# Patient Record
(demographics unavailable — no encounter records)

---

## 2025-02-03 NOTE — ADDENDUM
[FreeTextEntry1] : Documented by Sheri Patterson acting as a scribe for Dr. Bowers and Barotlome Alarcon PA-C on 02/03/2025. All medical record entries made by the Scribe were at my, Dr. Bowers, and Bartolome Alarcon's, direction and personally dictated by me on 02/03/2025. I have reviewed the chart and agree that the record accurately reflects my personal performance of the history, physical exam, procedure and imaging.

## 2025-02-03 NOTE — PHYSICAL EXAM
[de-identified] : General: Well appearing, no acute distress  Neurologic: A&Ox3, No focal deficits  Head: NCAT without abrasions, lacerations, or ecchymosis to head, face, or scalp  Eyes: No scleral icterus, no gross abnormalities  Respiratory: Equal chest wall expansion bilaterally, no accessory muscle use  Lymphatic: No lymphadenopathy palpated  Skin: Warm and dry  Psychiatric: Normal mood and affect  Examination of the Right hip reveals no obvious deformity or leg length discrepancy. There is no swelling noted. The patient is nontender to palpation over the greater trochanter, groin, and IT band. The patients range of motion is to 110 degrees of hip flexion, 40 degrees of abduction, 20 degrees of adduction, 40 degrees of internal rotation and 45 degrees of external rotation. Tightness with external rotation. The patient has 5/5 strength to resisted hip flexion, abduction and adduction. The patient has a negative JAMES and FADIR Test. Negative Kincaid Test. Negative resisted SLR test at 30 degrees of hip flexion ([facility]). Negative Piriformis Test. No SI joint instability. There is no pain with logrolling. The calf and thigh are soft and nontender bilaterally. The patient is grossly neurovascularly intact distally.  [de-identified] : No new imaging.

## 2025-02-03 NOTE — DISCUSSION/SUMMARY
[de-identified] : We had a thorough discussion regarding the nature of his pain, the pathophysiology, as well as all treatment options. I discussed operative and non-operative treatment modalities. Recommend patient return to exercise and activity as tolerated. I also recommend patient follow up with Dr. Alfaro and/or Dr. Blackwood given his persistent back pain. All questions were answered and the patient verbalized understanding. The patient is in agreement with this treatment plan. Patient will follow up as needed or repeat clinical assessment.

## 2025-02-03 NOTE — DISCUSSION/SUMMARY
[de-identified] : We had a thorough discussion regarding the nature of his pain, the pathophysiology, as well as all treatment options. I discussed operative and non-operative treatment modalities. Recommend patient return to exercise and activity as tolerated. I also recommend patient follow up with Dr. Alfaro and/or Dr. Blackwood given his persistent back pain. All questions were answered and the patient verbalized understanding. The patient is in agreement with this treatment plan. Patient will follow up as needed or repeat clinical assessment.

## 2025-02-03 NOTE — REASON FOR VISIT
[Follow-Up Visit] : a follow-up visit for [FreeTextEntry2] : Of note, SPO Right hip arthroscopy with labrum repair; DOS: 1/24/24.

## 2025-02-03 NOTE — CONSULT LETTER
[Dear  ___] : Dear  [unfilled], [Consult Letter:] : I had the pleasure of evaluating your patient, [unfilled]. [Please see my note below.] : Please see my note below. [Sincerely,] : Sincerely, [FreeTextEntry3] : Dr. Rinku Bowers

## 2025-02-03 NOTE — HISTORY OF PRESENT ILLNESS
[de-identified] : KAMILLE FAIRBANKS is a 36 year old male being seen for f/u visit R hip pain. He was ref'd by Dr. Alex. States that he was following with physical therapy but was then cut off. Of note, SPO Right hip arthroscopy with labrum repair; DOS: 1/24/24. He states his R hip has been causing him pain again lately. Describes his pain as an intermittent soreness and ache. He endorses back pain as well. He states he has put on some weight in the past few months.

## 2025-02-03 NOTE — ADDENDUM
[FreeTextEntry1] : Documented by Sheri Patterson acting as a scribe for Dr. Bowers and Bartolome Alarcon PA-C on 02/03/2025. All medical record entries made by the Scribe were at my, Dr. Bowers, and Bartolome Alarcon's, direction and personally dictated by me on 02/03/2025. I have reviewed the chart and agree that the record accurately reflects my personal performance of the history, physical exam, procedure and imaging.

## 2025-02-03 NOTE — HISTORY OF PRESENT ILLNESS
[de-identified] : KAMILLE FAIRBANKS is a 36 year old male being seen for f/u visit R hip pain. He was ref'd by Dr. Alex. States that he was following with physical therapy but was then cut off. Of note, SPO Right hip arthroscopy with labrum repair; DOS: 1/24/24. He states his R hip has been causing him pain again lately. Describes his pain as an intermittent soreness and ache. He endorses back pain as well. He states he has put on some weight in the past few months.

## 2025-02-03 NOTE — REVIEW OF SYSTEMS
[Joint Pain] : joint pain [Negative] : Heme/Lymph [FreeTextEntry9] : Of note, SPO Right hip arthroscopy with labrum repair; DOS: 1/24/24.

## 2025-02-03 NOTE — PHYSICAL EXAM
[de-identified] : General: Well appearing, no acute distress  Neurologic: A&Ox3, No focal deficits  Head: NCAT without abrasions, lacerations, or ecchymosis to head, face, or scalp  Eyes: No scleral icterus, no gross abnormalities  Respiratory: Equal chest wall expansion bilaterally, no accessory muscle use  Lymphatic: No lymphadenopathy palpated  Skin: Warm and dry  Psychiatric: Normal mood and affect  Examination of the Right hip reveals no obvious deformity or leg length discrepancy. There is no swelling noted. The patient is nontender to palpation over the greater trochanter, groin, and IT band. The patients range of motion is to 110 degrees of hip flexion, 40 degrees of abduction, 20 degrees of adduction, 40 degrees of internal rotation and 45 degrees of external rotation. Tightness with external rotation. The patient has 5/5 strength to resisted hip flexion, abduction and adduction. The patient has a negative JAMES and FADIR Test. Negative Kincaid Test. Negative resisted SLR test at 30 degrees of hip flexion ([facility]). Negative Piriformis Test. No SI joint instability. There is no pain with logrolling. The calf and thigh are soft and nontender bilaterally. The patient is grossly neurovascularly intact distally.  [de-identified] : No new imaging.

## 2025-02-25 NOTE — PHYSICAL EXAM
[FreeTextEntry1] : NAD A&Ox3 Mild obesity ROM: extension 35-40' (static) w/ pain endROM, 3/4 full flexion w/o pain R Hip PROM: smooth IR/ER w/o pain Pelvic Tilt: LESS + right (improved) Strength: 5/5 strength in bilateral lower extremities (static) Reflexes: 2+ Patella reflex bilaterally, 2+ Achilles reflex bilaterally, negative clonus bilaterally Sensation: SILT in bilateral lower extremities Special tests: SLR: negative bilaterally JAMES: neg right   FADIR: neg right Able to heel and toe walk

## 2025-02-25 NOTE — ASSESSMENT
[Indicate if, in your opinion, the incident that the patient described was the competent medical cause of this injury/illness.] : The incident that the patient described was the competent medical cause of this injury/illness: Yes [Indicate if the patient's complaints are consistent with his/her history of the injury/illness.] : Indicate if the patient's complaints are consistent with his/her history of the injury/illness: Yes [Yes] : Yes, it is consistent [Can the patient return to usual work activities as indicated? If yes, indicate date___] : The patient cannot return to usual work activities as indicated. [Are there any work limitations? (If so, explain and quantify, including the anticipated duration of the limitations)] : There are work limitations. [FreeTextEntry7] : SEE SCANNED FUNCTIONAL CAPACITY EVALUATION  [Physical Disability Temporary Total] : temporarily total disabled [FreeTextEntry1] : 36 y.o. M w/ h/o chronic LS back pain s/p work-related injury (7/9/19) found to have underlying right hip FA impingement now s/p arthroscopy and labral repair (1/24/24).  I spent most of today's office visit (20 min) reviewing the patient's x-rays, discussing etiology, pathogenesis and further non-operative vs. operative management. The patient did very well after his hip arthroscopy and labral repair but is not having some increased right sided axial LBP.  X-rays lumbosacral spine significant for mild to moderate L5-S1 degenerative disc disease.  I advised the judicious use of PO NSAIDs 2' to their GI/cardiac/renal toxicities.  I advised him to walk in a pool for weight loss and core stabilization.  We may consider the need for lumbar medial branch blocks to rule out symptomatic lumbar spondylosis if he is unable to advance his HEP.  Pt. is in agreement with plan. All questions answered. Return to office 6 months. [FreeTextEntry5] : 0

## 2025-02-25 NOTE — DATA REVIEWED
[Plain X-Rays] : plain X-Rays [MRI] : MRI [Other: ___] : [unfilled] [FreeTextEntry1] : X-rays L-spine (2 views obtained at today's office visit): There is mild to moderate L5-S1 disc space height loss more in the posterior aspect of the disc space causing fishmouthing appearance.  There is more mild L3-4 disc space height loss.  FUNCTIONAL CAPACITY EVALUATION (10/4/22): pt. is unable to meet job demands for the following activities: mid lift, pushing, pulling, overall strength, kneeling and handling.  He is able to meet his job demands for low high and full lifts, carrying, sitting/standing, bending, crouching, immediate and overhead reaching, standing and walking.    MR Arthrogram Right Hip (10/27/22): Non-displaced tearing of the anterosuperior/anterior acetabular labrum with tiny paralabral cyst seen along the anterior labrum.  Decreased right femoral head-neck offset, likely representing underlying cam-type impingement.  R L5-S1 ILESI (10/10/21) R Lumbar MBBs L2-L5 (11/14/21) R SI joint CSI (3/10/22)  X-rays Pelvis and b/l hips: abnormal femoral head neck offset w/ relatively preserved FA joint spaces; SI joints intact.  MRI L-spine 7/1/22 official read: Focal herniations of the nucleus pulposus centrally and on the left at the L3/4 and L5/S1 levels with impingement of nerve roots centrally on the left with moderate central stenosis. Focal central herniation of the nucleus pulposus at L4?5 level with impingement and nerve roots centrally and with mild to moderate central stenosis.  My interpretation: L3-L4, L4-L5, L5-S1 DDD,  left paracentral disc at L5-S1 impinging on descending on S1 root in the lateral recess.

## 2025-02-25 NOTE — HISTORY OF PRESENT ILLNESS
[FreeTextEntry1] : 36 y.o. M w/ h/o chronic LS back pain s/p work-related injury (7/9/19) found to have underlying right hip FA impingement returns to office for follow-up.  Patient last seen October 16, 2023.  Patient's interim orthopedic history significant for right hip arthroscopy with labral repair (1/24/2024).  Pt. states that he was doing well post operatively until about December 2024 when his lower back began bothering him again.  Pain is largely on the right side w/ radiation into the ipsilateral buttock but not down the leg.  Denies N/T/B in leg or foot.  No recent spinal imaging.  No recent courses of P.T.  Not taking any NSAIDs.  No spinal injections.  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